# Patient Record
Sex: FEMALE | Race: WHITE | URBAN - METROPOLITAN AREA
[De-identification: names, ages, dates, MRNs, and addresses within clinical notes are randomized per-mention and may not be internally consistent; named-entity substitution may affect disease eponyms.]

---

## 2022-10-26 ENCOUNTER — OFFICE VISIT (OUTPATIENT)
Dept: URBAN - METROPOLITAN AREA CLINIC 80 | Facility: CLINIC | Age: 77
End: 2022-10-26
Payer: MEDICARE

## 2022-10-26 DIAGNOSIS — H52.223 REGULAR ASTIGMATISM, BILATERAL: ICD-10-CM

## 2022-10-26 DIAGNOSIS — H35.3132 NONEXUDATIVE MACULAR DEGENERATION, INTERMEDIATE DRY STAGE, BILATERAL: ICD-10-CM

## 2022-10-26 DIAGNOSIS — H25.13 AGE-RELATED NUCLEAR CATARACT, BILATERAL: Primary | ICD-10-CM

## 2022-10-26 PROCEDURE — 99204 OFFICE O/P NEW MOD 45 MIN: CPT | Performed by: OPHTHALMOLOGY

## 2022-10-26 ASSESSMENT — INTRAOCULAR PRESSURE
OD: 18
OS: 17

## 2022-10-26 NOTE — IMPRESSION/PLAN
Impression: Age-related nuclear cataract, bilateral: H25.13. Plan: Cataracts account for patient's complaints. The patient demonstrates impaired visual function believed not to be correctable with change in glasses or contact lenses and ADLs are impaired. Discussed cataracts, treatment options, and surgical risks/benefits with patient. Patient elects surgical treatment. Recommend surgery OU, OD first. Aim OD: [place]. Aim OS: -0.25. Recommend ORA. Recommend monofocal IOL, see notes. 

NOTES: intermed dry AMD OU

## 2022-10-26 NOTE — IMPRESSION/PLAN
Impression: Nonexudative macular degeneration, intermediate dry stage, bilateral: H35.0182. Plan: Discussed. Patient had recent consult with retina specialist, confirmed dry. Advised patient BCVA may be affected after cataract surgery. Continue to monitor with referring optom, and retina specialist as needed.

## 2022-10-26 NOTE — IMPRESSION/PLAN
Impression: Regular astigmatism, bilateral: H52.223. Plan: Recommend TORIC IOL OU for best vision. Patient understands they will need glasses for full-time wear if astigmatism is not corrected. She is a candidate for LRI at time of cataract surgery. Patient aware LRI will not correct all astigmatism. Advised patient that dry AMD may affect BCVA after cataract surgery, and she may have blurriness despite TORIC IOL correcting astigmatism.

## 2022-11-17 ENCOUNTER — SURGERY (OUTPATIENT)
Dept: URBAN - METROPOLITAN AREA SURGERY 50 | Facility: SURGERY | Age: 77
End: 2022-11-17
Payer: MEDICARE

## 2022-11-17 DIAGNOSIS — H52.223 REGULAR ASTIGMATISM, BILATERAL: ICD-10-CM

## 2022-11-17 DIAGNOSIS — H35.3132 NONEXUDATIVE AGE-RELATED MACULAR DEGENERATION, BILATERAL, INTERMEDIATE DRY STAGE: ICD-10-CM

## 2022-11-17 DIAGNOSIS — H25.13 AGE-RELATED NUCLEAR CATARACT, BILATERAL: Primary | ICD-10-CM

## 2022-11-17 PROCEDURE — PR1CP PR1CP: CUSTOM | Performed by: OPHTHALMOLOGY
